# Patient Record
(demographics unavailable — no encounter records)

---

## 2020-06-24 NOTE — OPERATIVE REPORT
Operative Report


DATE OF SURGERY: 06/24/20


PREOPERATIVE DIAGNOSIS: 1.  Invasive left breast ductal carcinoma.  2.  History 

of liver insufficiency


POSTOPERATIVE DIAGNOSIS: Same


OPERATION: 1.  Ultrasound directed, needle localized breast lumpectomy.  2.  

Interpretation of intraoperative submitted radiograph.  3.  [place] lymph node 

biopsy left axilla x1


SURGEON: QUYNH LAWSON


1ST ASSISTANT: LANE KOWALSKI


ANESTHESIA: GA


TISSUE REMOVED OR ALTERED: Left breast lump; medial-deep margin: Left axillary 

sentinel node


COMPLICATIONS: 





None


ESTIMATED BLOOD LOSS: Scant


INTRAOPERATIVE FINDINGS: See below


PROCEDURE: 





The patient was seen in the preop holding area after undergoing 

lymphoscintigraphy of the left breast.  Patient was found to have an area of 

increased uptake in the left axilla.  Left breast was marked.  In addition the 

patient underwent needle localization of the previously placed clip marker in 

the left breast.  The patient was taken to the operating room where general 

anesthesia was induced.  Left axilla was abducted, wire and needle clipped at 

the skin level from the left breast, and approximately 2 cc of full-strength 

methylene blue was injected into the left breast, 2 o'clock position, 

intradermally.  The left breast was massaged for approximately 4 minutes, and 

the left breast and left axilla were prepped and draped in sterile fashion.





Surgical plan surgical timeout were conducted.





Using intraoperative ultrasonography, we identified what we believed to be the 

clip and tumor in the left breast.  The needle and wire could not be appreciated

ultrasonographically.  The tumor was difficult to confirmed by ultrasonography 

due to its subtle features.  We anesthetized the skin with 1% plain lidocaine, 

and made an elliptical incision with a very narrow width of skin, approximately 

4 mm in length, with the length of the horizontally oriented lumpectomy incision

approximately 5 cm long.  This encompassed the needle at the medial aspect of 

the incision.  We now performed a lumpectomy approximately 5 x 6 x 7 cm in 

dimensions using electrocautery.  Unfortunately the needle and wire popped out 

of the specimen during dissection.  The specimen was removed from the patient, 

marked with a short suture, 2-0 silk, the superior position and a long suture in

the lateral position, and scanned with the ultrasound and felt to have a clip in

it.  We then placed the specimen in a cup and performed a specimen radiograph 

which demonstrated retention of the clip.  Confirmation of the tumor again was 

difficult due to adult features of the target lesion.  The specimen was now 

personally taken by Dr. Lawson to pathology where it was reviewed with Dr. malcolm.  The specimen was inked, and sliced vertically.  The tumor, and clip 

marker were found in the medial deep aspect of the lumpectomy specimen.  The 

margins appear to be grossly clear, however we felt that a general resection of 

the cavity was appropriate.





Dr. Lawson  scrubbed back into the field, and removed a small portion of fatty

tissue from the deep aspect of the lumpectomy cavity including the medial and 

inferior wall approximately 3 x 3 x 1/2 cm.  This was labeled with a short 

suture in the superior position, long suture in the lateral position.  It was 

sent for permanent analysis.





We now proceeded to perform a left axillary sentinel lymph node biopsy.  An area

of increased activity in the mid left axilla was identified.  Skin was 

anesthetized 1% plain lidocaine.  A 2 and half centimeter incision was made in 

the axilla, using electrocautery and S-shaped retractors, a dominant, sentinel 

lymph node which was hot and blue was identified in the low, level 1 axillary 

region.  The in vivo count was 19,816 and the ex vivo count was 7533.  It was 

removed using gentle traction electrocautery.  Background counts were 

negligible.  We felt the sentinel lymph node biopsy portion of the operation was

complete.





Sponge needle counts are correct.  Both breast the lumpectomy wound and axillary

wounds were inspected for bleeding and there was none.  Wounds closed with 3-0 

Vicryl, and glue.





Patient tolerated the procedure well, extubated, taken recovery in stable 

condition.





The physician assistant, Ms. Tanner, provided assistance during this case by:  

retracting tissue, instillation of local anesthesia and closure of skin 

incisions.

## 2020-06-24 NOTE — DISCHARGE SUMMARY
Discharge Summary (SDC)





- Discharge


Final Diagnosis: 





Left breast cancer


Date of Surgery: 06/24/20


Discharge Date: 06/24/20


Condition: Good


Treatment or Instructions: 








                             [facility]


                               [address], [place] [address]


                  Phone: [phone]    Fax:  [phone]


                                        








                   Care Instructions Following Your Lumpectomy





Activities:


Resume normal activities when you feel comfortable.  It is best to remain as 

active as possible to speed your recovery. It is common to experience some 

fatigue after surgery and you may find that short naps are helpful.  Avoid 

strenuous activity such as weight lifting, tennis, etc at your surgical site for

two weeks.  Perform gentle arm exercises daily and do not favor your operative

arm to due increased risk of mobility issues postoperatively.  No driving for 7 

days after surgery.  Do not drive if you are taking pain medication other than 

Tylenol or Ibuprofen.  No swimming, tub baths or soaking in a hot tub for 4 

weeks. There are no dietary restrictions.  Do not smoke as this impairs wound 

healing.





Surgical Site care:


You may shower in 24 hours to include washing the wound with soap and water 

using your hands.   Do not scrub the incision. Leave skin glue intact. Pat the 

area dry with a towel. You do not need to recover the wound although some 

patients find that they feel more comfortable using a light dressing for a few 

days to absorb any minimal drainage which may occur.   You may apply deodorant 

if you are careful to avoid getting it on the wound itself. 








Medications:


Take Toradol 10mg one pill by mouth every six hours around the clock. Do not 

take additional NSAIDs with medication.  Resume all of your normal prescription 

medications after your surgery unless instructed otherwise.





You may experience constipation after surgery while taking pain medications.  If

using a narcotic on a regular basis, take a stool softener such as Colace twice 

a day.  It is helpful to stay hydrated by drinking lots of fluids.  Walking is 

also helpful and is good exercise after surgery.  If you need extra help, use 

Milk of Magnesia according to the directions on the package. 





Follow-up:


Call our office at [phone] to make a follow-up appointment in 10-14 days.

 Your doctor will call to discuss the pathology report with you as soon as it is

available.   





Concerns:


If you had a sentinel lymph node biopsy with your mastectomy, your urine may 

have a greenish discoloration.  This is normal and will resolve as the blue dye 

slowly leaves your system.  If you notice significant leakage around the drains,

this is not normal.  The drains may be clogged.   Please call our office to come

in immediately for the drains to be checked.  Some bruising may occur and will 

go away over time.  If you have a fever of 101.5 or greater, chills, redness at 

the incision site, excessive drainage from your wound or severe pain not 

relieved by pain medication, call your doctor.  A physician is available 24 

hours a day 7 days a week in addition to regular office hours.  If problems 

arise after normal office hours please call the hospital at [phone].  

Please call [phone] if you have any questions or concerns.  


Prescriptions: 


Ketorolac Tromethamine [Toradol 10 mg Tablet] 10 mg PO Q6HP PRN #20 tablet


 PRN Reason: 


Referrals: 


MELISSA PIERCE MD [Primary Care Provider] - 


Discharge Diet: As Tolerated


Discharge Activity: Activity As Tolerated, Balance Activity w/Rest, Walk 

Frequently


Report the Following to Your Physician Immediately: Increase in Pain, Fever over

101 Degrees, Unusual Bleeding, Redness, Swelling, Warmth, Increased Soreness, 

Drainage-Foul Smelling

## 2020-06-25 NOTE — WOMENS IMAGING REPORT
EXAM DESCRIPTION:  WIRE LOC MAMMO; BREAST SPECIMEN



IMAGES COMPLETED DATE/TIME:  6/24/2020 12:57 pm; 6/24/2020 2:44 pm



REASON FOR STUDY:  C50.912 MALIGNANT NEOPLASM OF UNSPECIFIED SITE OF LEFT FEMALE BREAST; LEFT BREAST 
SPECIMEN IN OR C50.912  MALIGNANT NEOPLASM OF UNSPECIFIED SITE OF LEFT FEMAL Z15.01  GENETIC SUSCEPTI
BILITY TO MALIGNANT NEOPLASM OF BREAS



COMPARISON:  2/12/2020 and 12/23/2020



TECHNIQUE:  The marker in the left  breast was localized mammographically using a grid marker.  The s
kin of the breast was prepped in sterile fashion and local anesthesia was provided.  The localization
 needle was advanced to the target.  The tip was positioned adjacent to the target and confirmed with
 two orthogonal views. Surgical dye was not injected for this procedure.  The wire was placed through
 the needle and the hook engaged. Post procedure mammogram demonstrates satisfactory position of the 
needle and wire.

Specimen radiograph demonstrates the targeted lesion within the biopsy specimen.



LIMITATIONS:  None.



FINDINGS:  Procedure as above.

Pathology: Pending.



IMPRESSION:  SUCCESSFUL NEEDLE LOCALIZATION OF THE LESION IN THE LEFT BREAST.  FOLLOW-UP AS PER THE ROSA GONZALES'S SURGEON.



TECHNICAL DOCUMENTATION:  JOB ID:  [id number]

 2011 [employer]- All Rights Reserved



Reading location - IP/workstation name: [email or web address]

## 2020-06-25 NOTE — RADIOLOGY REPORT (SQ)
EXAM DESCRIPTION:  NM LYMPHATICS/LYMPH GLANDS



IMAGES COMPLETED DATE/TIME:  6/24/2020 10:53 am



REASON FOR STUDY:  LEFT BREAST CANCER C50.912  MALIGNANT NEOPLASM OF UNSPECIFIED SITE OF LEFT FEMAL Z
15.01  GENETIC SUSCEPTIBILITY TO MALIGNANT NEOPLASM OF BREAS



COMPARISON:  None.



RADIONUCLIDE AND DOSE:  584 microcuries TC-99m tilmanocept - [employer].

The route of agent administration:  Subcutaneous and intradermal injections



TECHNIQUE:  The skin of the left breast was prepped in sterile fashion.  The radiopharmaceutical was 
administered in equally divided doses in the periareolar breast.



LIMITATIONS:  None.



FINDINGS:  Images demonstrate activity at the injection site as well as within the left axilla.



IMPRESSION:  ADMINISTRATION OF RADIOPHARMACEUTICAL FOR SENTINEL LYMPH NODE EVALUATION.



TECHNICAL DOCUMENTATION:  JOB ID:  [id number]

 2011 [employer]- All Rights Reserved



Reading location - IP/workstation name: [email or web address]

## 2020-06-25 NOTE — RADIOLOGY REPORT (SQ)
EXAM DESCRIPTION:  WIRE LOC MAMMO; BREAST SPECIMEN



IMAGES COMPLETED DATE/TIME:  6/24/2020 12:57 pm; 6/24/2020 2:44 pm



REASON FOR STUDY:  C50.912 MALIGNANT NEOPLASM OF UNSPECIFIED SITE OF LEFT FEMALE BREAST; LEFT BREAST 
SPECIMEN IN OR C50.912  MALIGNANT NEOPLASM OF UNSPECIFIED SITE OF LEFT FEMAL Z15.01  GENETIC SUSCEPTI
BILITY TO MALIGNANT NEOPLASM OF BREAS



COMPARISON:  2/12/2020 and 12/23/2020



TECHNIQUE:  The marker in the left  breast was localized mammographically using a grid marker.  The s
kin of the breast was prepped in sterile fashion and local anesthesia was provided.  The localization
 needle was advanced to the target.  The tip was positioned adjacent to the target and confirmed with
 two orthogonal views. Surgical dye was not injected for this procedure.  The wire was placed through
 the needle and the hook engaged. Post procedure mammogram demonstrates satisfactory position of the 
needle and wire.

Specimen radiograph demonstrates the targeted lesion within the biopsy specimen.



LIMITATIONS:  None.



FINDINGS:  Procedure as above.

Pathology: Pending.



IMPRESSION:  SUCCESSFUL NEEDLE LOCALIZATION OF THE LESION IN THE LEFT BREAST.  FOLLOW-UP AS PER THE ROSA GONZALES'S SURGEON.



TECHNICAL DOCUMENTATION:  JOB ID:  [id number]

 2011 [employer]- All Rights Reserved



Reading location - IP/workstation name: [email or web address]